# Patient Record
Sex: FEMALE | Race: WHITE | NOT HISPANIC OR LATINO | Employment: FULL TIME | URBAN - METROPOLITAN AREA
[De-identification: names, ages, dates, MRNs, and addresses within clinical notes are randomized per-mention and may not be internally consistent; named-entity substitution may affect disease eponyms.]

---

## 2022-06-01 ENCOUNTER — TELEPHONE (OUTPATIENT)
Dept: OBGYN CLINIC | Facility: CLINIC | Age: 52
End: 2022-06-01

## 2022-06-01 DIAGNOSIS — Z79.899 ON SSRI THERAPY: Primary | ICD-10-CM

## 2022-06-01 NOTE — TELEPHONE ENCOUNTER
Pt has a WA scheduled for 9/16/2022 and is requesting to have her prescription for her Sertraline renewed until seen  Rx; Sertraline 75 mg daily (50 mg tablet-takes 1 1/2 tablets daily)  [facility]  Last WA- [facility] 10/20/2020-pt reports she has been taking since last seen  New script was sent 3/2021

## 2022-09-14 PROBLEM — F32.A DEPRESSION: Status: ACTIVE | Noted: 2022-09-14

## 2022-09-14 PROBLEM — Z97.5 IUD (INTRAUTERINE DEVICE) IN PLACE: Status: ACTIVE | Noted: 2022-09-14

## 2022-09-14 NOTE — PROGRESS NOTES
Assessment/Plan:    Encounter for gynecological examination (general) (routine) without abnormal findings  Here for well check, doing well  Jose Desai in general, had months without bleeding, but light bleeding noted early this month for 5 days  Had been almost a year prior to that  No menopausal symptoms  Normal breast and pelvic exams  Last pap 9/2019 neg/HPV neg; due by 2024  Mammo order given, last 2020  Colonoscopy 7/2021      Depression/Anxiety  Has been on Sertraline 75 mg, would like to increase to 100 mg to manage some break through anxiety  Continues with significant life stressors  Rx sent  IUD (intrauterine device) in [facility] 10/4/2019  Will maintain up until 2026       Diagnoses and all orders for this visit:    Encounter for gynecological examination (general) (routine) without abnormal findings    Encounter for screening mammogram for malignant neoplasm of breast  -     Mammo screening bilateral w 3d & cad; Future    On SSRI therapy  -     sertraline (ZOLOFT) 100 mg tablet; Take 1 tablet (100 mg total) by mouth daily    Other orders  -     Levonorgestrel 20 1 MCG/DAY IUD; by Intrauterine route          Subjective:      Patient ID: Orlando Davis is a 46 y o  female  HPI Here for well check  The following portions of the patient's history were reviewed and updated as appropriate:   She  has a past medical history of Depression (Divorce), History of colonoscopy (07/12/2021), History of screening mammography (11/19/2020), Miscarriage, and Varicella (Child)  She   Patient Active Problem List    Diagnosis Date Noted    Encounter for gynecological examination (general) (routine) without abnormal findings 09/16/2022    Depression/Anxiety 09/14/2022    IUD (intrauterine device) in [facility] 10/4/2019 09/14/2022     She  has a past surgical history that includes Cholecystectomy; Mammo (historical) (11/24/2020); and Colonoscopy (07/2021)    Her family history includes Heart failure in her mother  She  reports that she has never smoked  She has never used smokeless tobacco  She reports that she does not drink alcohol and does not use drugs  Current Outpatient Medications   Medication Sig Dispense Refill    Levonorgestrel 20 1 MCG/DAY IUD by Intrauterine route      sertraline (ZOLOFT) 100 mg tablet Take 1 tablet (100 mg total) by mouth daily 90 tablet 4     No current facility-administered medications for this visit  She is allergic to sulfamethoxazole-trimethoprim and clindamycin       Review of Systems  No breast, bladder, bowel changes   No new persistent pain, bloating, early satiety or pelvic pressure      Objective:      /68   Ht 5' 6" (1 676 m)   Wt 75 5 kg (166 lb 6 4 oz)   Breastfeeding No   BMI 26 86 kg/m²          Physical Exam    General appearance: no distress, pleasant  Neck: thyroid without nodules or thyromegaly, no palpable adenopathy  Lymph nodes: no palpable adenopathy  Breasts: no masses, nodes or skin changes  Abdomen: soft, non tender, no palpable masses  Pelvic exam: normal external genitalia, urethral meatus normal, vagina without lesions, cervix with IUD string noted in os, no lesions, uterus small, non tender, no adnexal masses, non tender  Rectal exam: no masses, non tender, RV confirms above

## 2022-09-16 ENCOUNTER — ANNUAL EXAM (OUTPATIENT)
Dept: OBGYN CLINIC | Facility: CLINIC | Age: 52
End: 2022-09-16

## 2022-09-16 ENCOUNTER — TELEPHONE (OUTPATIENT)
Dept: OBGYN CLINIC | Facility: CLINIC | Age: 52
End: 2022-09-16

## 2022-09-16 VITALS
SYSTOLIC BLOOD PRESSURE: 100 MMHG | BODY MASS INDEX: 26.74 KG/M2 | WEIGHT: 166.4 LBS | DIASTOLIC BLOOD PRESSURE: 68 MMHG | HEIGHT: 66 IN

## 2022-09-16 DIAGNOSIS — Z12.31 ENCOUNTER FOR SCREENING MAMMOGRAM FOR MALIGNANT NEOPLASM OF BREAST: ICD-10-CM

## 2022-09-16 DIAGNOSIS — Z79.899 ON SSRI THERAPY: ICD-10-CM

## 2022-09-16 DIAGNOSIS — Z01.419 ENCOUNTER FOR GYNECOLOGICAL EXAMINATION (GENERAL) (ROUTINE) WITHOUT ABNORMAL FINDINGS: Primary | ICD-10-CM

## 2022-09-16 RX ORDER — SERTRALINE HYDROCHLORIDE 100 MG/1
100 TABLET, FILM COATED ORAL DAILY
Qty: 90 TABLET | Refills: 4 | Status: SHIPPED | OUTPATIENT
Start: 2022-09-16

## 2022-09-16 NOTE — ASSESSMENT & PLAN NOTE
Here for well check, doing well  Rambo Basket in general, had months without bleeding, but light bleeding noted early this month for 5 days  Had been almost a year prior to that  No menopausal symptoms  Normal breast and pelvic exams    Last pap 9/2019 neg/HPV neg; due by 2024  Mammo order given, last 2020  Colonoscopy 7/2021

## 2022-09-16 NOTE — LETTER
September 16, 2022     Hernandez Correa MD  [address] [facility] [address]    Patient: Maria Del Rosario Hendricks   YOB: 1970   Date of Visit: 9/16/2022       Dear Dr Silvia Ramos: Thank you for referring Maria Del Rosario Hendricks to me for evaluation  Below are my notes for this consultation  If you have questions, please do not hesitate to call me  I look forward to following your patient along with you  Sincerely,        Baron Talbert MD        CC: No Recipients  Baron Talbert MD  9/16/2022  4:10 PM  Addendum  Assessment/Plan:    Encounter for gynecological examination (general) (routine) without abnormal findings  Here for well check, doing well  Preeti Mathew in general, had months without bleeding, but light bleeding noted early this month for 5 days  Had been almost a year prior to that  No menopausal symptoms  Normal breast and pelvic exams  Last pap 9/2019 neg/HPV neg; due by 2024  Mammo order given, last 2020  Colonoscopy 7/2021      Depression/Anxiety  Has been on Sertraline 75 mg, would like to increase to 100 mg to manage some break through anxiety  Continues with significant life stressors  Rx sent  IUD (intrauterine device) in place - [facility] 10/4/2019  Will maintain up until 2026       Diagnoses and all orders for this visit:    Encounter for gynecological examination (general) (routine) without abnormal findings    Encounter for screening mammogram for malignant neoplasm of breast  -     Mammo screening bilateral w 3d & cad; Future    On SSRI therapy  -     sertraline (ZOLOFT) 100 mg tablet; Take 1 tablet (100 mg total) by mouth daily    Other orders  -     Levonorgestrel 20 1 MCG/DAY IUD; by Intrauterine route          Subjective:      Patient ID: Maria Del Rosario Hendricks is a 46 y o  female  HPI Here for well check        The following portions of the patient's history were reviewed and updated as appropriate:   She  has a past medical history of Depression (Divorce), History of colonoscopy (07/12/2021), History of screening mammography (11/19/2020), Miscarriage, and Varicella (Child)  She   Patient Active Problem List    Diagnosis Date Noted    Encounter for gynecological examination (general) (routine) without abnormal findings 09/16/2022    Depression/Anxiety 09/14/2022    IUD (intrauterine device) in place - [facility] 10/4/2019 09/14/2022     She  has a past surgical history that includes Cholecystectomy; Mammo (historical) (11/24/2020); and Colonoscopy (07/2021)  Her family history includes Heart failure in her mother  She  reports that she has never smoked  She has never used smokeless tobacco  She reports that she does not drink alcohol and does not use drugs  Current Outpatient Medications   Medication Sig Dispense Refill    Levonorgestrel 20 1 MCG/DAY IUD by Intrauterine route      sertraline (ZOLOFT) 100 mg tablet Take 1 tablet (100 mg total) by mouth daily 90 tablet 4     No current facility-administered medications for this visit  She is allergic to sulfamethoxazole-trimethoprim and clindamycin       Review of Systems  No breast, bladder, bowel changes   No new persistent pain, bloating, early satiety or pelvic pressure      Objective:      /68   Ht 5' 6" (1 676 m)   Wt 75 5 kg (166 lb 6 4 oz)   Breastfeeding No   BMI 26 86 kg/m²          Physical Exam    General appearance: no distress, pleasant  Neck: thyroid without nodules or thyromegaly, no palpable adenopathy  Lymph nodes: no palpable adenopathy  Breasts: no masses, nodes or skin changes  Abdomen: soft, non tender, no palpable masses  Pelvic exam: normal external genitalia, urethral meatus normal, vagina without lesions, cervix with IUD string noted in os, no lesions, uterus small, non tender, no adnexal masses, non tender  Rectal exam: no masses, non tender, RV confirms above

## 2022-09-16 NOTE — TELEPHONE ENCOUNTER
Spoke with patient and she informed she had a Colonoscopy done on 7/12/2021 through [facility], [address] for [address] [place] [address]

## 2022-09-16 NOTE — TELEPHONE ENCOUNTER
Please call pt, I failed to ask about colonoscopy at today's visit  If done, please document  If not, I recommend either [facility]: [phone] or [place] [facility]: [phone]  Thanks

## 2022-09-16 NOTE — ASSESSMENT & PLAN NOTE
Has been on Sertraline 75 mg, would like to increase to 100 mg to manage some break through anxiety  Continues with significant life stressors  Rx sent

## 2022-09-16 NOTE — PATIENT INSTRUCTIONS
Return to office in one year unless having any problems such as breast changes, bleeding, new persistent pain, new progressive bloating, new problems eating (getting full to quickly) or new constant urinary pressure that does not resolve in one week  Call in six months to schedule your annual visit  For the incontinence, I recommend bladder training with timed voids every 2-3 hours while awake, avoidance of irritants specifically tea/coffee/soda products and Kegel exercises  You can teach yourself the exercises by stopping your urine flow on the toilet  Then hold for 10 seconds, 10 times in a row  If you do that set of exercises 2-3 times per day, you will gain strength, then when you sneeze you can squeeze up first and getter better control

## 2022-09-16 NOTE — LETTER
September 16, 2022     Asif Traore MD  [address] [facility] [address]    Patient: Tod Vallecillo   YOB: 1970   Date of Visit: 9/16/2022       Dear Dr Yvette Gutierrez: Thank you for referring Tod Vallecillo to me for evaluation  Below are my notes for this consultation  If you have questions, please do not hesitate to call me  I look forward to following your patient along with you  Sincerely,        Janiya Bates MD        CC: No Recipients  Janiya Bates MD  9/16/2022  1:35 PM  Sign when Signing Visit  Assessment/Plan:    Encounter for gynecological examination (general) (routine) without abnormal findings  Here for well check, doing well  Candelario Adame in general, had months without bleeding, but light bleeding noted early this month for 5 days  Had been almost a year prior to that  No menopausal symptoms  Normal breast and pelvic exams  Last pap 9/2019 neg/HPV neg; due by 2024  Mammo order given, last 2020  No colonoscopy noted to date  Depression/Anxiety  Has been on Sertraline 75 mg, would like to increase to 100 mg to manage some break through anxiety  Continues with significant life stressors  Rx sent  IUD (intrauterine device) in place - [facility] 10/4/2019  Will maintain up until 2026       Diagnoses and all orders for this visit:    Encounter for gynecological examination (general) (routine) without abnormal findings    Encounter for screening mammogram for malignant neoplasm of breast  -     Mammo screening bilateral w 3d & cad; Future    On SSRI therapy  -     sertraline (ZOLOFT) 100 mg tablet; Take 1 tablet (100 mg total) by mouth daily    Other orders  -     Levonorgestrel 20 1 MCG/DAY IUD; by Intrauterine route          Subjective:      Patient ID: Tod Vallecillo is a 46 y o  female  HPI Here for well check        The following portions of the patient's history were reviewed and updated as appropriate:   She  has a past medical history of Depression (Divorce), History of screening mammography (11/19/2020), Miscarriage, and Varicella (Child)  She   Patient Active Problem List    Diagnosis Date Noted    Encounter for gynecological examination (general) (routine) without abnormal findings 09/16/2022    Depression/Anxiety 09/14/2022    IUD (intrauterine device) in place - [facility] 10/4/2019 09/14/2022     She  has a past surgical history that includes Cholecystectomy and Mammo (historical) (11/24/2020)  Her family history includes Heart failure in her mother  She  reports that she has never smoked  She has never used smokeless tobacco  She reports that she does not drink alcohol and does not use drugs  Current Outpatient Medications   Medication Sig Dispense Refill    Levonorgestrel 20 1 MCG/DAY IUD by Intrauterine route      sertraline (ZOLOFT) 100 mg tablet Take 1 tablet (100 mg total) by mouth daily 90 tablet 4     No current facility-administered medications for this visit  She is allergic to sulfamethoxazole-trimethoprim and clindamycin       Review of Systems  No breast, bladder, bowel changes   No new persistent pain, bloating, early satiety or pelvic pressure      Objective:      /68   Ht 5' 6" (1 676 m)   Wt 75 5 kg (166 lb 6 4 oz)   Breastfeeding No   BMI 26 86 kg/m²          Physical Exam    General appearance: no distress, pleasant  Neck: thyroid without nodules or thyromegaly, no palpable adenopathy  Lymph nodes: no palpable adenopathy  Breasts: no masses, nodes or skin changes  Abdomen: soft, non tender, no palpable masses  Pelvic exam: normal external genitalia, urethral meatus normal, vagina without lesions, cervix with IUD string noted in os, no lesions, uterus small, non tender, no adnexal masses, non tender  Rectal exam: no masses, non tender, RV confirms above

## 2022-10-18 DIAGNOSIS — Z12.31 ENCOUNTER FOR SCREENING MAMMOGRAM FOR MALIGNANT NEOPLASM OF BREAST: ICD-10-CM

## 2023-10-13 ENCOUNTER — ANNUAL EXAM (OUTPATIENT)
Dept: OBGYN CLINIC | Facility: CLINIC | Age: 53
End: 2023-10-13
Payer: COMMERCIAL

## 2023-10-13 VITALS
DIASTOLIC BLOOD PRESSURE: 68 MMHG | SYSTOLIC BLOOD PRESSURE: 122 MMHG | BODY MASS INDEX: 27.74 KG/M2 | HEIGHT: 66 IN | WEIGHT: 172.6 LBS

## 2023-10-13 DIAGNOSIS — Z12.4 SCREENING FOR MALIGNANT NEOPLASM OF THE CERVIX: ICD-10-CM

## 2023-10-13 DIAGNOSIS — Z01.419 ENCOUNTER FOR GYNECOLOGICAL EXAMINATION (GENERAL) (ROUTINE) WITHOUT ABNORMAL FINDINGS: Primary | ICD-10-CM

## 2023-10-13 DIAGNOSIS — Z79.899 ON SSRI THERAPY: ICD-10-CM

## 2023-10-13 DIAGNOSIS — Z12.31 BREAST CANCER SCREENING BY MAMMOGRAM: ICD-10-CM

## 2023-10-13 PROCEDURE — 99396 PREV VISIT EST AGE 40-64: CPT | Performed by: OBSTETRICS & GYNECOLOGY

## 2023-10-13 RX ORDER — SERTRALINE HYDROCHLORIDE 100 MG/1
100 TABLET, FILM COATED ORAL DAILY
Qty: 90 TABLET | Refills: 4 | Status: SHIPPED | OUTPATIENT
Start: 2023-10-13

## 2023-10-13 NOTE — ASSESSMENT & PLAN NOTE
Here for well check, happy with Liletta, no menses, no menopause symptoms. Lost mother 12/2021 to cardiac arrest at 76. Doing well on sertraline, no SI. Refills sent. Normal breast and pelvic exams.   Last pap 9/2019 neg/HPV neg; repeated today  Mammo order given, last 9/30/22  Colonoscopy 7/2021, due ?2028

## 2023-10-13 NOTE — PATIENT INSTRUCTIONS
Return to office in one year unless having any problems such as breast changes, bleeding, new persistent pain, new progressive bloating, new problems eating (getting full to quickly) or new constant urinary pressure that does not resolve in one week. Call in six months to schedule your annual visit.

## 2023-10-13 NOTE — PROGRESS NOTES
Assessment/Plan:    Encounter for gynecological examination (general) (routine) without abnormal findings  Here for well check, happy with Liletta, no menses, no menopause symptoms. Lost mother 12/2021 to cardiac arrest at 76. Doing well on sertraline, no SI. Refills sent. Normal breast and pelvic exams. Last pap 9/2019 neg/HPV neg; repeated today  Mammo order given, last 9/30/22  Colonoscopy 7/2021, due ?2028     Diagnoses and all orders for this visit:    Breast cancer screening by mammogram  -     Mammo screening bilateral w 3d & cad; Future    On SSRI therapy  -     sertraline (ZOLOFT) 100 mg tablet; Take 1 tablet (100 mg total) by mouth daily    Screening for malignant neoplasm of the cervix  -     IGP, Aptima HPV, Rfx 16/18,45    Encounter for gynecological examination (general) (routine) without abnormal findings          Subjective:      Patient ID: Kristine Ndiaye is a 46 y.o. female. HPI Here for well check. The following portions of the patient's history were reviewed and updated as appropriate: She  has a past medical history of Depression (Divorce), History of colonoscopy (07/12/2021), History of screening mammography (11/19/2020), Miscarriage, and Varicella (Child). She   Patient Active Problem List    Diagnosis Date Noted    Encounter for gynecological examination (general) (routine) without abnormal findings 09/16/2022    Depression/Anxiety 09/14/2022    IUD (intrauterine device) in place - Yenny Greer 10/4/2019 09/14/2022     She  has a past surgical history that includes Cholecystectomy; Mammo (historical) (11/24/2020); and Colonoscopy (07/2021). Her family history includes Colon cancer in her paternal grandfather; Glaucoma in her maternal grandmother; Heart failure in her mother; Lung cancer in her maternal grandfather; No Known Problems in her daughter, daughter, paternal grandmother, sister, sister, sister, and son; Prostate cancer in her father and paternal grandfather.   She  reports that she has never smoked. She has never used smokeless tobacco. She reports that she does not drink alcohol and does not use drugs. Current Outpatient Medications   Medication Sig Dispense Refill    Levonorgestrel 20.1 MCG/DAY IUD by Intrauterine route      sertraline (ZOLOFT) 100 mg tablet Take 1 tablet (100 mg total) by mouth daily 90 tablet 4     No current facility-administered medications for this visit. She is allergic to sulfamethoxazole-trimethoprim and clindamycin. .    Review of Systems  No breast, bladder, bowel changes.  No new persistent pain, bloating, early satiety or pelvic pressure  No menses with Liletta    Objective:      /68 (BP Location: Left arm, Patient Position: Sitting, Cuff Size: Large)   Ht 5' 5.5" (1.664 m)   Wt 78.3 kg (172 lb 9.6 oz)   BMI 28.29 kg/m²          Physical Exam    General appearance: no distress, pleasant  Neck: thyroid without nodules or thyromegaly, no palpable adenopathy  Lymph nodes: no palpable adenopathy  Breasts: no masses, nodes or skin changes  Abdomen: soft, non tender, no palpable masses  Pelvic exam: normal external genitalia, urethral meatus normal, vagina without lesions, cervix with IUD string in os and without lesions, uterus small, non tender, no adnexal masses, non tender  Rectal exam: no masses, non tender, RV confirms above

## 2023-10-13 NOTE — LETTER
October 13, 2023     Renato Anton MD  [address]    Patient: Severo Haddock   YOB: 1970   Date of Visit: 10/13/2023       Dear Dr. Benjamin De Paz:    Severo Haddock was in today for her annual gyn exam. Below are my notes for this consultation. If you have questions, please do not hesitate to call me. I look forward to following your patient along with you. Sincerely,        Anya Thompson MD        CC: No Recipients    Anya Thompson MD  10/13/2023  9:40 AM  Sign when Signing Visit  Assessment/Plan:    Encounter for gynecological examination (general) (routine) without abnormal findings  Here for well check, happy with Liletta, no menses, no menopause symptoms. Lost mother 12/2021 to cardiac arrest at 76. Doing well on sertraline, no SI. Refills sent. Normal breast and pelvic exams. Last pap 9/2019 neg/HPV neg; repeated today  Mammo order given, last 9/30/22  Colonoscopy 7/2021, due ?2028     Diagnoses and all orders for this visit:    Breast cancer screening by mammogram  -     Mammo screening bilateral w 3d & cad; Future    On SSRI therapy  -     sertraline (ZOLOFT) 100 mg tablet; Take 1 tablet (100 mg total) by mouth daily    Screening for malignant neoplasm of the cervix  -     IGP, Aptima HPV, Rfx 16/18,45    Encounter for gynecological examination (general) (routine) without abnormal findings          Subjective:      Patient ID: Severo Haddock is a 46 y.o. female. HPI Here for well check. The following portions of the patient's history were reviewed and updated as appropriate: She  has a past medical history of Depression (Divorce), History of colonoscopy (07/12/2021), History of screening mammography (11/19/2020), Miscarriage, and Varicella (Child).   She   Patient Active Problem List    Diagnosis Date Noted   • Encounter for gynecological examination (general) (routine) without abnormal findings 09/16/2022   • Depression/Anxiety 09/14/2022   • IUD (intrauterine device) in place - Jackeline Mehta 10/4/2019 09/14/2022     She  has a past surgical history that includes Cholecystectomy; Mammo (historical) (11/24/2020); and Colonoscopy (07/2021). Her family history includes Colon cancer in her paternal grandfather; Glaucoma in her maternal grandmother; Heart failure in her mother; Lung cancer in her maternal grandfather; No Known Problems in her daughter, daughter, paternal grandmother, sister, sister, sister, and son; Prostate cancer in her father and paternal grandfather. She  reports that she has never smoked. She has never used smokeless tobacco. She reports that she does not drink alcohol and does not use drugs. Current Outpatient Medications   Medication Sig Dispense Refill   • Levonorgestrel 20.1 MCG/DAY IUD by Intrauterine route     • sertraline (ZOLOFT) 100 mg tablet Take 1 tablet (100 mg total) by mouth daily 90 tablet 4     No current facility-administered medications for this visit. She is allergic to sulfamethoxazole-trimethoprim and clindamycin. .    Review of Systems  No breast, bladder, bowel changes.  No new persistent pain, bloating, early satiety or pelvic pressure  No menses with Liletta    Objective:      /68 (BP Location: Left arm, Patient Position: Sitting, Cuff Size: Large)   Ht 5' 5.5" (1.664 m)   Wt 78.3 kg (172 lb 9.6 oz)   BMI 28.29 kg/m²          Physical Exam    General appearance: no distress, pleasant  Neck: thyroid without nodules or thyromegaly, no palpable adenopathy  Lymph nodes: no palpable adenopathy  Breasts: no masses, nodes or skin changes  Abdomen: soft, non tender, no palpable masses  Pelvic exam: normal external genitalia, urethral meatus normal, vagina without lesions, cervix with IUD string in os and without lesions, uterus small, non tender, no adnexal masses, non tender  Rectal exam: no masses, non tender, RV confirms above

## 2023-10-18 LAB
CYTOLOGIST CVX/VAG CYTO: NORMAL
DX ICD CODE: NORMAL
HPV GENOTYPE REFLEX: NORMAL
HPV I/H RISK 4 DNA CVX QL PROBE+SIG AMP: NEGATIVE
OTHER STN SPEC: NORMAL
PATH REPORT.FINAL DX SPEC: NORMAL
SL AMB NOTE:: NORMAL
SL AMB SPECIMEN ADEQUACY: NORMAL
SL AMB TEST METHODOLOGY: NORMAL

## 2023-11-22 DIAGNOSIS — Z12.31 BREAST CANCER SCREENING BY MAMMOGRAM: ICD-10-CM

## 2024-02-21 PROBLEM — Z01.419 ENCOUNTER FOR GYNECOLOGICAL EXAMINATION (GENERAL) (ROUTINE) WITHOUT ABNORMAL FINDINGS: Status: RESOLVED | Noted: 2022-09-16 | Resolved: 2024-02-21

## 2024-10-11 NOTE — PROGRESS NOTES
Assessment/Plan:    Encounter for gynecological examination (general) (routine) without abnormal findings  Here for well check, no breast or gyn complaints.   No bleeding, IUD effective until 2027.  Normal breast and pelvic exams.  Last pap 10/2023 neg/HPV neg; due 2028  Mammo order given, last 10/14/23 BIRADS 2C  Colonoscopy 7/2021, due 2028?, maybe 2026 with f/h colon cancer and polyps  Dexa to start @55, discussed depending on steroid intake    Depression/Anxiety  Stable on 100 mg sertraline. Rx refilled       Diagnoses and all orders for this visit:    Breast cancer screening by mammogram  -     Mammo screening bilateral w 3d and cad; Future    Encounter for gynecological examination (general) (routine) without abnormal findings    On SSRI therapy  -     sertraline (ZOLOFT) 100 mg tablet; Take 1 tablet (100 mg total) by mouth daily    Other depression    Other orders  -     Liquid-based pap, screening  -     Vtama 1 % CREA; APPLY 1(ONE) APPLICATION TOPICALLY EVERY NIGHT AT BEDTIME          Subjective:      Patient ID: Keena He is a 53 y.o. female.    HPI Here for well check.    The following portions of the patient's history were reviewed and updated as appropriate: She  has a past medical history of Depression (Divorce), History of colonoscopy (07/12/2021), History of screening mammography (11/19/2020), Miscarriage, Psoriasis, and Varicella (Child).  She   Patient Active Problem List    Diagnosis Date Noted    Depression/Anxiety 09/14/2022    IUD (intrauterine device) in place - Liletta 10/4/2019 09/14/2022     She  has a past surgical history that includes Cholecystectomy; Mammo (historical) (11/24/2020); and Colonoscopy (07/2021).  Her family history includes Colon cancer in her paternal grandfather; Colon polyps in her father; Glaucoma in her maternal grandmother; Heart failure in her mother; Lung cancer in her maternal grandfather; No Known Problems in her daughter, daughter, paternal grandmother,  "sister, sister, sister, and son; Prostate cancer in her father and paternal grandfather.  She  reports that she has never smoked. She has never used smokeless tobacco. She reports that she does not drink alcohol and does not use drugs.  Current Outpatient Medications   Medication Sig Dispense Refill    Levonorgestrel 20.1 MCG/DAY IUD by Intrauterine route      sertraline (ZOLOFT) 100 mg tablet Take 1 tablet (100 mg total) by mouth daily 90 tablet 4    Vtama 1 % CREA APPLY 1(ONE) APPLICATION TOPICALLY EVERY NIGHT AT BEDTIME       No current facility-administered medications for this visit.     She is allergic to sulfamethoxazole-trimethoprim and clindamycin..    Review of Systems  No breast, bladder, bowel changes. No new persistent pain, bloating, early satiety or pelvic pressure  No bleeding, IUD    Objective:      /78 (BP Location: Left arm, Patient Position: Sitting, Cuff Size: Large)   Ht 5' 6\" (1.676 m)   Wt 76.7 kg (169 lb 3.2 oz)   BMI 27.31 kg/m²          Physical Exam    General appearance: no distress, pleasant  Neck: thyroid without nodules or thyromegaly, no palpable adenopathy  Lymph nodes: no palpable adenopathy  Breasts: no masses, nodes or skin changes  Abdomen: soft, non tender, no palpable masses  Pelvic exam: normal external genitalia, urethral meatus normal, vagina atrophic without lesions, cervix with IUD string in os and without lesions, uterus small, non tender, no adnexal masses, non tender  Rectal exam: no masses, non tender, RV confirms above    "

## 2024-10-15 ENCOUNTER — ANNUAL EXAM (OUTPATIENT)
Dept: OBGYN CLINIC | Facility: CLINIC | Age: 54
End: 2024-10-15
Payer: COMMERCIAL

## 2024-10-15 VITALS
DIASTOLIC BLOOD PRESSURE: 78 MMHG | BODY MASS INDEX: 27.19 KG/M2 | SYSTOLIC BLOOD PRESSURE: 110 MMHG | WEIGHT: 169.2 LBS | HEIGHT: 66 IN

## 2024-10-15 DIAGNOSIS — F32.89 OTHER DEPRESSION: ICD-10-CM

## 2024-10-15 DIAGNOSIS — Z01.419 ENCOUNTER FOR GYNECOLOGICAL EXAMINATION (GENERAL) (ROUTINE) WITHOUT ABNORMAL FINDINGS: Primary | ICD-10-CM

## 2024-10-15 DIAGNOSIS — Z12.31 BREAST CANCER SCREENING BY MAMMOGRAM: ICD-10-CM

## 2024-10-15 DIAGNOSIS — Z79.899 ON SSRI THERAPY: ICD-10-CM

## 2024-10-15 PROCEDURE — 99396 PREV VISIT EST AGE 40-64: CPT | Performed by: OBSTETRICS & GYNECOLOGY

## 2024-10-15 RX ORDER — TAPINAROF 10 MG/1000MG
CREAM TOPICAL
COMMUNITY
Start: 2024-09-04

## 2024-10-15 RX ORDER — SERTRALINE HYDROCHLORIDE 100 MG/1
100 TABLET, FILM COATED ORAL DAILY
Qty: 90 TABLET | Refills: 4 | Status: SHIPPED | OUTPATIENT
Start: 2024-10-15

## 2024-10-15 NOTE — PATIENT INSTRUCTIONS
Carleen Mejia, PCP in [place]    Return to office in one year unless having any problems such as breast changes, bleeding, new persistent pain, new progressive bloating, new problems eating (getting full to quickly) or new constant urinary pressure that does not resolve in one week.    Continue to strive for 1200 mg of calcium and 1000 IU of vitamin D daily in diet and supplements combined for your bone health.  You can only absorb 600 mg of calcium at a time. Avoid excess calcium as this may adversely effect your heart.  There are 400 mg of calcium in an 8 oz serving of dairy.  [place] milk has 600 mg of calcium in an 8 oz serving.

## 2024-10-15 NOTE — ASSESSMENT & PLAN NOTE
Here for well check, no breast or gyn complaints.   No bleeding, IUD effective until 2027.  Normal breast and pelvic exams.  Last pap 10/2023 neg/HPV neg; due 2028  Mammo order given, last 10/14/23 BIRADS 2C  Colonoscopy 7/2021, due 2028?, maybe 2026 with f/h colon cancer and polyps  Dexa to start @55, discussed depending on steroid intake

## 2024-10-17 DIAGNOSIS — Z12.31 BREAST CANCER SCREENING BY MAMMOGRAM: ICD-10-CM

## 2024-11-19 ENCOUNTER — OFFICE VISIT (OUTPATIENT)
Dept: FAMILY MEDICINE CLINIC | Facility: CLINIC | Age: 54
End: 2024-11-19
Payer: COMMERCIAL

## 2024-11-19 VITALS
SYSTOLIC BLOOD PRESSURE: 140 MMHG | HEART RATE: 64 BPM | WEIGHT: 173.6 LBS | DIASTOLIC BLOOD PRESSURE: 79 MMHG | OXYGEN SATURATION: 98 % | BODY MASS INDEX: 28.02 KG/M2

## 2024-11-19 DIAGNOSIS — M25.50 ARTHRALGIA, UNSPECIFIED JOINT: ICD-10-CM

## 2024-11-19 DIAGNOSIS — Z00.00 ANNUAL PHYSICAL EXAM: Primary | ICD-10-CM

## 2024-11-19 DIAGNOSIS — Z13.1 SCREENING FOR DIABETES MELLITUS: ICD-10-CM

## 2024-11-19 DIAGNOSIS — Z13.0 SCREENING FOR DEFICIENCY ANEMIA: ICD-10-CM

## 2024-11-19 DIAGNOSIS — Z13.6 SCREENING FOR CARDIOVASCULAR CONDITION: ICD-10-CM

## 2024-11-19 DIAGNOSIS — F32.5 MAJOR DEPRESSIVE DISORDER WITH SINGLE EPISODE, IN FULL REMISSION (HCC): ICD-10-CM

## 2024-11-19 DIAGNOSIS — Z13.29 SCREENING FOR THYROID DISORDER: ICD-10-CM

## 2024-11-19 DIAGNOSIS — Z76.89 ENCOUNTER TO ESTABLISH CARE WITH NEW DOCTOR: ICD-10-CM

## 2024-11-19 DIAGNOSIS — L40.9 PSORIASIS: ICD-10-CM

## 2024-11-19 DIAGNOSIS — R03.0 ELEVATED BLOOD PRESSURE READING WITHOUT DIAGNOSIS OF HYPERTENSION: ICD-10-CM

## 2024-11-19 PROBLEM — F41.9 ANXIETY: Status: ACTIVE | Noted: 2024-11-19

## 2024-11-19 PROBLEM — F32.A DEPRESSION: Status: RESOLVED | Noted: 2022-09-14 | Resolved: 2024-11-19

## 2024-11-19 PROCEDURE — 99214 OFFICE O/P EST MOD 30 MIN: CPT | Performed by: FAMILY MEDICINE

## 2024-11-19 PROCEDURE — 99386 PREV VISIT NEW AGE 40-64: CPT | Performed by: FAMILY MEDICINE

## 2024-11-19 RX ORDER — TIRZEPATIDE 2.5 MG/.5ML
2.5 INJECTION, SOLUTION SUBCUTANEOUS WEEKLY
Qty: 2 ML | Refills: 0 | Status: SHIPPED | OUTPATIENT
Start: 2024-11-19 | End: 2024-12-17

## 2024-11-19 RX ORDER — CLOBETASOL PROPIONATE 0.5 MG/G
CREAM TOPICAL 2 TIMES DAILY
COMMUNITY
Start: 2024-11-07

## 2024-11-19 NOTE — PATIENT INSTRUCTIONS
"Patient Education     Routine physical for adults   The Basics   Written by the doctors and editors at [facility]   What is a physical? -- A physical is a routine visit, or \"check-up,\" with your doctor. You might also hear it called a \"wellness visit\" or \"preventive visit.\"  During each visit, the doctor will:   Ask about your physical and mental health   Ask about your habits, behaviors, and lifestyle   Do an exam   Give you vaccines if needed   Talk to you about any medicines you take   Give advice about your health   Answer your questions  Getting regular check-ups is an important part of taking care of your health. It can help your doctor find and treat any problems you have. But it's also important for preventing health problems.  A routine physical is different from a \"sick visit.\" A sick visit is when you see a doctor because of a health concern or problem. Since physicals are scheduled ahead of time, you can think about what you want to ask the doctor.  How often should I get a physical? -- It depends on your age and health. In general, for people age 21 years and older:   If you are younger than 50 years, you might be able to get a physical every 3 years.   If you are 50 years or older, your doctor might recommend a physical every year.  If you have an ongoing health condition, like diabetes or high blood pressure, your doctor will probably want to see you more often.  What happens during a physical? -- In general, each visit will include:   Physical exam - The doctor or nurse will check your height, weight, heart rate, and blood pressure. They will also look at your eyes and ears. They will ask about how you are feeling and whether you have any symptoms that bother you.   Medicines - It's a good idea to bring a list of all the medicines you take to each doctor visit. Your doctor will talk to you about your medicines and answer any questions. Tell them if you are having any side effects that bother you. You " "should also tell them if you are having trouble paying for any of your medicines.   Habits and behaviors - This includes:   Your diet   Your exercise habits   Whether you smoke, drink alcohol, or use drugs   Whether you are sexually active   Whether you feel safe at home  Your doctor will talk to you about things you can do to improve your health and lower your risk of health problems. They will also offer help and support. For example, if you want to quit smoking, they can give you advice and might prescribe medicines. If you want to improve your diet or get more physical activity, they can help you with this, too.   Lab tests, if needed - The tests you get will depend on your age and situation. For example, your doctor might want to check your:   Cholesterol   Blood sugar   Iron level   Vaccines - The recommended vaccines will depend on your age, health, and what vaccines you already had. Vaccines are very important because they can prevent certain serious or deadly infections.   Discussion of screening - \"Screening\" means checking for diseases or other health problems before they cause symptoms. Your doctor can recommend screening based on your age, risk, and preferences. This might include tests to check for:   Cancer, such as breast, prostate, cervical, ovarian, colorectal, prostate, lung, or skin cancer   Sexually transmitted infections, such as chlamydia and gonorrhea   Mental health conditions like depression and anxiety  Your doctor will talk to you about the different types of screening tests. They can help you decide which screenings to have. They can also explain what the results might mean.   Answering questions - The physical is a good time to ask the doctor or nurse questions about your health. If needed, they can refer you to other doctors or specialists, too.  Adults older than 65 years often need other care, too. As you get older, your doctor will talk to you about:   How to prevent falling at " home   Hearing or vision tests   Memory testing   How to take your medicines safely   Making sure that you have the help and support you need at home  All topics are updated as new evidence becomes available and our peer review process is complete.  This topic retrieved from [employer] on: May 02, 2024.  Topic 229522 Version 1.0  Release: 32.4.3 - C32.122  © 2024 UpToDate, Inc. and/or its affiliates. All rights reserved.  Consumer Information Use and Disclaimer   Disclaimer: This generalized information is a limited summary of diagnosis, treatment, and/or medication information. It is not meant to be comprehensive and should be used as a tool to help the user understand and/or assess potential diagnostic and treatment options. It does NOT include all information about conditions, treatments, medications, side effects, or risks that may apply to a specific patient. It is not intended to be medical advice or a substitute for the medical advice, diagnosis, or treatment of a health care provider based on the health care provider's examination and assessment of a patient's specific and unique circumstances. Patients must speak with a health care provider for complete information about their health, medical questions, and treatment options, including any risks or benefits regarding use of medications. This information does not endorse any treatments or medications as safe, effective, or approved for treating a specific patient. UpToDate, Inc. and its affiliates disclaim any warranty or liability relating to this information or the use thereof.The use of this information is governed by the Terms of Use, available at https://www.wolters[employer]cl[employer] Therapeuticsuwer.com/en/know/clinical-effectiveness-terms. 2024© UpToDate, Inc. and its affiliates and/or licensors. All rights reserved.  Copyright   © 2024 UpToDate, Inc. and/or its affiliates. All rights reserved.

## 2024-11-19 NOTE — PROGRESS NOTES
Adult Annual Physical  Name: Keena He      : 1970      MRN: [id number]  Encounter Provider: Carleen Mejia DO  Encounter Date: 2024   Encounter department: [facility]    Assessment & Plan  Annual physical exam  Discussed diet and exercise  Screening labs ordered  Pap, colon cancer screening and mammogram are up to date.   Td up to date.   Had flu vaccine this season,.        Encounter to establish care with new doctor         BMI 28.0-28.9,adult   Patient denies personal and family history of MCT and MEN2 tumors. Patient denies personal history of pancreatitis   Does have family history of pancreatitis in paternal GF.   She has been trying to watch diet, limit calories and difficulty getting weight off.   Rx for zepbound sent to pharmacy. Doubt that this will be covered given her bmi of 28  If not, can consider referral to weight management.   Orders:  •  tirzepatide (Zepbound) 2.5 mg/0.5 mL auto-injector; Inject 0.5 mL (2.5 mg total) under the skin once a week for 28 days    Psoriasis  Recently diagnosed with this by dermatologist in [place].     Orders:  •  Ambulatory Referral to Rheumatology    Screening for diabetes mellitus    Orders:  •  Comprehensive metabolic panel    Screening for cardiovascular condition    Orders:  •  Lipid panel    Screening for deficiency anemia    Orders:  •  CBC and differential    Screening for thyroid disorder    Orders:  •  TSH, 3rd generation with Free T4 reflex    Major depressive disorder with single episode, in full remission (HCC)  Stable on zoloft 100 mg daily         Elevated blood pressure reading without diagnosis of hypertension  DASH diet.   Check ambulatory bp at home.        Arthralgia, unspecified joint  Has pain in joints. Not terrible. All over. No obvious swelling.   Just diagnosed with psoriasis so wondering if joints are involved  Check labs  Refer rheum  Orders:  •  Ambulatory Referral to Rheumatology  •   C-reactive protein  •  Cyclic citrul peptide antibody, IgG  •  MITZY Screen w/ Reflex to Titer/Pattern  •  Lyme Total AB W Reflex to IGM/IGG      Immunizations and preventive care screenings were discussed with patient today. Appropriate education was printed on patient's after visit summary.    Counseling:  Alcohol/drug use: discussed moderation in alcohol intake, the recommendations for healthy alcohol use, and avoidance of illicit drug use.  Dental Health: discussed importance of regular tooth brushing, flossing, and dental visits.  Injury prevention: discussed safety/seat belts, safety helmets, smoke detectors, carbon monoxide detectors, and smoking near bedding or upholstery.  Sexual health: discussed sexually transmitted diseases, partner selection, use of condoms, avoidance of unintended pregnancy, and contraceptive alternatives.  Exercise: the importance of regular exercise/physical activity was discussed. Recommend exercise 3-5 times per week for at least 30 minutes.          History of Present Illness     Adult Annual Physical:  Patient presents for annual physical.     Diet and Physical Activity:  - Diet/Nutrition:. bagel or muffin for breakfast. luch is sandwich. dinner is either cereal or something light  - Exercise: walking and 1-2 times a week on average. also goes to gym at [email or web address].    Depression Screening:    - PHQ-9 Score: 1    General Health:  - Sleep: sleeps well.  - Hearing: normal hearing right ear and normal hearing left ear.  - Vision: no vision problems, wears glasses and most recent eye exam > 1 year ago.  - Dental: regular dental visits.    /GYN Health:  - Follows with GYN: yes.   - Contraception: IUD placement.      Patient is a 53 year old female who is being seen today as a new patient to establish care. Is due for PE.     Previous PCP=Jose Pace MD at [facility]  There are no records for review at time of today's visit  Colon cancer screen is up to date (9/16/22)  Cervical cancer screen  is up to date (10/13/03)--sees gyn at [facility].   Mammogram is up to date (10/15/24)  Shingrix up to date.   Had Td in 2015.     Chronic medical problems as noted below:  Patient Active Problem List   Diagnosis   • IUD (intrauterine device) in place - Liletta 10/4/2019   • Psoriasis   • Major depressive disorder with single episode, in full remission (HCC)   • Anxiety     Wants to try one of the new weight loss drugs  Would like to lose about 30 lbs.             Review of Systems  Medical History Reviewed by provider this encounter:  Tobacco  Allergies  Meds  Problems  Med Hx  Surg Hx  Fam Hx  Soc   Hx    .  Current Outpatient Medications on File Prior to Visit   Medication Sig Dispense Refill   • clobetasol (TEMOVATE) 0.05 % cream Apply topically 2 (two) times a day Apply to effected area at bedtime for two weeks.     • Levonorgestrel 20.1 MCG/DAY IUD by Intrauterine route     • sertraline (ZOLOFT) 100 mg tablet Take 1 tablet (100 mg total) by mouth daily 90 tablet 4   • Vtama 1 % CREA APPLY 1(ONE) APPLICATION TOPICALLY EVERY NIGHT AT BEDTIME       No current facility-administered medications on file prior to visit.        Objective   /79   Pulse 64   Wt 78.7 kg (173 lb 9.6 oz)   SpO2 98%   BMI 28.02 kg/m²     Physical Exam  Vitals and nursing note reviewed.   Constitutional:       General: She is not in acute distress.     Appearance: Normal appearance. She is not ill-appearing, toxic-appearing or diaphoretic.   HENT:      Head: Normocephalic and atraumatic.      Right Ear: Tympanic membrane normal.      Left Ear: Tympanic membrane normal.      Nose: Nose normal.      Mouth/Throat:      Mouth: Mucous membranes are moist.      Pharynx: No posterior oropharyngeal erythema.   Eyes:      Extraocular Movements: Extraocular movements intact.      Conjunctiva/sclera: Conjunctivae normal.      Pupils: Pupils are equal, round, and reactive to light.   Cardiovascular:      Rate and Rhythm: Normal rate and  regular rhythm.      Heart sounds: No murmur heard.  Pulmonary:      Effort: Pulmonary effort is normal.      Breath sounds: Normal breath sounds.   Abdominal:      General: Abdomen is flat. Bowel sounds are normal.      Palpations: Abdomen is soft.   Musculoskeletal:      Cervical back: Normal range of motion and neck supple.      Right lower leg: No edema.      Left lower leg: No edema.   Lymphadenopathy:      Cervical: No cervical adenopathy.   Skin:     General: Skin is warm and dry.      Findings: No rash.   Neurological:      General: No focal deficit present.      Mental Status: She is alert and oriented to person, place, and time.   Psychiatric:         Mood and Affect: Mood normal.

## 2024-11-19 NOTE — ASSESSMENT & PLAN NOTE
Recently diagnosed with this by dermatologist in [place].     Orders:    Ambulatory Referral to Rheumatology

## 2024-11-20 ENCOUNTER — TELEPHONE (OUTPATIENT)
Age: 54
End: 2024-11-20

## 2024-11-20 NOTE — TELEPHONE ENCOUNTER
PA for tirzepatide (Zepbound) 2.5 mg/0.5 mL auto-injector SUBMITTED to [facility]    via    []CMM-KEY:   [x]Surescripts-Case ID # [id number]   []Availity-Auth ID # NDC #   []Faxed to plan   []Other website   []Phone call Case ID #     [x]PA sent as URGENT    All office notes, labs and other pertaining documents and studies sent. Clinical questions answered. Awaiting determination from insurance company.     Turnaround time for your insurance to make a decision on your Prior Authorization can take 7-21 business days.                  \"[facility] [employer] Medical Information\" form printed and placed in Dr. Colorado's mail basket to complete & sign.    Last office visit:  4/6/23.

## 2024-11-21 ENCOUNTER — PATIENT MESSAGE (OUTPATIENT)
Dept: FAMILY MEDICINE CLINIC | Facility: CLINIC | Age: 54
End: 2024-11-21

## 2024-11-22 NOTE — PATIENT COMMUNICATION
Has diagnosis of elevated blood pressure without diagnosis of hypertension.   Could try to resubmit prior auth using that.....

## 2024-11-22 NOTE — TELEPHONE ENCOUNTER
"Please submit appeal for patient to include BMI of 28 and the following from     \"Has diagnosis of elevated blood pressure without diagnosis of hypertension.   Could try to resubmit prior auth using that.....\"    Please advise patient of any updates. Thank you.  "

## 2024-11-22 NOTE — TELEPHONE ENCOUNTER
Please have provider put this in chart notes to submit. Insurance is requesting documentation, they will not take verbal.

## 2024-11-22 NOTE — TELEPHONE ENCOUNTER
PA for tirzepatide (Zepbound) 2.5 mg/0.5 mL auto-injector DENIED    Reason:(Screenshot if applicable)        Message sent to office clinical pool Yes    Denial letter scanned into Media Yes    Appeal started No (Provider will need to decide if appeal is warranted and send clinical documentation to Prior Authorization Team for initiation.)    **Please follow up with your patient regarding denial and next steps**

## 2024-11-25 NOTE — TELEPHONE ENCOUNTER
"Her note already has the diagnosis  \"elevated blood pressure without diagnosis of hypertension\" included in the assessment and plan.   "

## 2024-11-25 NOTE — TELEPHONE ENCOUNTER
PA for Zepbound 2.5mg APPEALED via     []CMM  []SS  [x]Letter sent to insurance via fax to [phone] sent w/ provider msg below and OV notes- requested decision be expedited  []Other site or means     All necessary records sent. Will await response from insurance company    Turnaround time for a decision to be made on an appeal could take up to 30 business days

## 2024-11-26 ENCOUNTER — TELEPHONE (OUTPATIENT)
Age: 54
End: 2024-11-26

## 2024-11-26 NOTE — TELEPHONE ENCOUNTER
Called Dr Elder who is reviewing the denial for patients Zepbound, please advise-patient doesn't have any other health concerns, like Hypertension?

## 2024-11-26 NOTE — TELEPHONE ENCOUNTER
A doctor Jael called about this patient wanting to speak to doctor Mejia in regards to a zepbound review for this patient. He requested if she could please give him a call back at [phone]     thank you

## 2024-11-26 NOTE — TELEPHONE ENCOUNTER
Currently does not have diagnosis of hypertension.   She is a new patient to me. Had elevated bp reading when I saw her in office but no prior records for review and on no meds for hypertension. This may change moving forward

## 2024-12-19 LAB
ALBUMIN SERPL-MCNC: 4.6 G/DL (ref 3.8–4.9)
ALP SERPL-CCNC: 100 IU/L (ref 44–121)
ALT SERPL-CCNC: 12 IU/L (ref 0–32)
ANA TITR SER IF: NEGATIVE {TITER}
AST SERPL-CCNC: 20 IU/L (ref 0–40)
B BURGDOR IGG+IGM SER QL IA: NEGATIVE
BASOPHILS # BLD AUTO: 0 X10E3/UL (ref 0–0.2)
BASOPHILS NFR BLD AUTO: 1 %
BILIRUB SERPL-MCNC: 1 MG/DL (ref 0–1.2)
BUN SERPL-MCNC: 12 MG/DL (ref 6–24)
BUN/CREAT SERPL: 14 (ref 9–23)
CALCIUM SERPL-MCNC: 9.1 MG/DL (ref 8.7–10.2)
CCP IGA+IGG SERPL IA-ACNC: 6 UNITS (ref 0–19)
CHLORIDE SERPL-SCNC: 102 MMOL/L (ref 96–106)
CHOLEST SERPL-MCNC: 179 MG/DL (ref 100–199)
CO2 SERPL-SCNC: 26 MMOL/L (ref 20–29)
CREAT SERPL-MCNC: 0.87 MG/DL (ref 0.57–1)
CRP SERPL-MCNC: 4 MG/L (ref 0–10)
EGFR: 79 ML/MIN/1.73
EOSINOPHIL # BLD AUTO: 0.1 X10E3/UL (ref 0–0.4)
EOSINOPHIL NFR BLD AUTO: 2 %
ERYTHROCYTE [DISTWIDTH] IN BLOOD BY AUTOMATED COUNT: 12.7 % (ref 11.7–15.4)
GLOBULIN SER-MCNC: 2.4 G/DL (ref 1.5–4.5)
GLUCOSE SERPL-MCNC: 91 MG/DL (ref 70–99)
HCT VFR BLD AUTO: 43.7 % (ref 34–46.6)
HDLC SERPL-MCNC: 64 MG/DL
HGB BLD-MCNC: 14.5 G/DL (ref 11.1–15.9)
IMM GRANULOCYTES # BLD: 0 X10E3/UL (ref 0–0.1)
IMM GRANULOCYTES NFR BLD: 0 %
LDLC SERPL CALC-MCNC: 100 MG/DL (ref 0–99)
LYMPHOCYTES # BLD AUTO: 1.7 X10E3/UL (ref 0.7–3.1)
LYMPHOCYTES NFR BLD AUTO: 34 %
MCH RBC QN AUTO: 29.3 PG (ref 26.6–33)
MCHC RBC AUTO-ENTMCNC: 33.2 G/DL (ref 31.5–35.7)
MCV RBC AUTO: 88 FL (ref 79–97)
MONOCYTES # BLD AUTO: 0.4 X10E3/UL (ref 0.1–0.9)
MONOCYTES NFR BLD AUTO: 8 %
NEUTROPHILS # BLD AUTO: 2.8 X10E3/UL (ref 1.4–7)
NEUTROPHILS NFR BLD AUTO: 55 %
PLATELET # BLD AUTO: 209 X10E3/UL (ref 150–450)
POTASSIUM SERPL-SCNC: 3.9 MMOL/L (ref 3.5–5.2)
PROT SERPL-MCNC: 7 G/DL (ref 6–8.5)
RBC # BLD AUTO: 4.95 X10E6/UL (ref 3.77–5.28)
SL AMB VLDL CHOLESTEROL CALC: 15 MG/DL (ref 5–40)
SODIUM SERPL-SCNC: 143 MMOL/L (ref 134–144)
TRIGL SERPL-MCNC: 84 MG/DL (ref 0–149)
TSH SERPL DL<=0.005 MIU/L-ACNC: 1.21 UIU/ML (ref 0.45–4.5)
WBC # BLD AUTO: 5.1 X10E3/UL (ref 3.4–10.8)

## 2024-12-19 NOTE — PROGRESS NOTES
Name: Keena He      : 1970      MRN: [id number]  Encounter Provider: Yojana Andrews DO  Encounter Date: 2024   Encounter department: [facility] [address]  :  Assessment & Plan  Psoriasis  Patient is a 54-year-old female presenting for further evaluation of psoriasis and arthralgias.  Patient reports that she was diagnosed with psoriasis 2 years ago based on biopsy.  Patient was tried on Otezla in the past which she was not able to tolerate.  Patient has now been maintained on topical agents which she thinks has been controlling her symptoms well.  Reports that the psoriasis is 90% better.  In terms of arthralgias, patient reports less than 5 minutes of pain occasionally in her bilateral feet in the morning.  Denies any prolonged morning stiffness.  No joint swelling.  Denies dactylitis or enthesitis.  No synovitis or joint tenderness on exam today.  Currently, low suspicion for psoriatic arthritis.  -Discussed symptoms to monitor for.  Advised patient to take photos if she develops any joint swelling.  Let me know if developing prolonged morning stiffness  -If psoriasis is uncontrolled on topical agents, other considerations can be methotrexate or biologic therapy.  Would defer to dermatology for further management.  -Call with worsening joint pain       RTC in 3-4 months     Pertinent Medical History   Reviewed       History of Present Illness   Keena He is a 54 y.o. female with a history of psoriasis and depression who presents for further evaluation of joint pain    HPI   Patient states that about 2 years ago she started developing a dry rash on her right hand which then started to progress up the arm.  Patient states she was seen by dermatology and had a biopsy which was consistent with psoriasis.  Patient states that she was given topical steroids which were not helping.  Patient states she received 2 rounds of oral steroids which helped completely take away the  "psoriasis.  Patient is now on just topical agents for psoriasis.  Patient states that she was tried on Otezla but developed GI symptoms and so had to stop it.  Patient states that currently her symptoms are 90% better and so it was decided with her dermatologist just to stay on topical agents.    Patient reports occasional pain in her bilateral feet in the morning that last for the first few steps.  States it is less than 5 minutes.  Denies any joint swelling.  No prolonged morning stiffness.  No enthesitis or dactylitis type symptoms.    Family hx: no known history of autoimmune conditions      Review of Systems  Complete ROS conducted as per HPI. In addition, denies:  Fever  Photosensitive rash  Sicca symptoms  Recurrent oral ulcers  Muscle weakness  Uveitis  Dactylitis  Chest pain  SOB  Pleurisy  Gross hematuria  Foamy urine  Raynaud's  Joint issues other than noted above    Current Outpatient Medications on File Prior to Visit   Medication Sig Dispense Refill    Levonorgestrel 20.1 MCG/DAY IUD by Intrauterine route      sertraline (ZOLOFT) 100 mg tablet Take 1 tablet (100 mg total) by mouth daily 90 tablet 4    Vtama 1 % CREA APPLY 1(ONE) APPLICATION TOPICALLY EVERY NIGHT AT BEDTIME      [DISCONTINUED] clobetasol (TEMOVATE) 0.05 % cream Apply topically 2 (two) times a day Apply to effected area at bedtime for two weeks.       No current facility-administered medications on file prior to visit.      Social History     Tobacco Use    Smoking status: Never    Smokeless tobacco: Never   Vaping Use    Vaping status: Never Used   Substance and Sexual Activity    Alcohol use: Never    Drug use: Never    Sexual activity: Not Currently     Birth control/protection: I.U.D.         Objective   /80 (BP Location: Right arm, Patient Position: Sitting, Cuff Size: Adult)   Pulse 74   Temp (!) 96.7 °F (35.9 °C) (Tympanic)   Ht 5' 6\" (1.676 m)   Wt 77.1 kg (170 lb)   SpO2 97%   BMI 27.44 kg/m²     Physical " Exam  General appearance: normal appearing, no acute distress  Skin: normal, psoriasis appreciated on right third dorsal PIP  HEENT: normal, moist oropharynx, no nasal or oral ulcers  Lymph nodes: no palpable adenopathy  Lungs: normal respiratory effort, comfortable on room air, lungs clear to auscultation b/l   Heart: normal heart sounds, normal rate, normal rhythm,  Abdomen: soft, normal bowel sounds, no tenderness  Neurologic: no obvious neurological deficits   Extremities: no edema, warm and well perfused     Musculoskeletal Exam:   - Observation: no obvious joint abnormalities    - Palpation: no joint tenderness, negative MTP squeeze test  - Synovitis: absent  - Joint effusions: absent  - ROM: intact throughout  - Muscle Strength: 5/5 throughout       Recent labs:  CRP normal  Anti-CCP negative  MITZY negative    I have personally reviewed notes, labs, and imaging available in the chart.     Yojana Andrews DO, CCD, [facility]

## 2024-12-20 ENCOUNTER — CONSULT (OUTPATIENT)
Age: 54
End: 2024-12-20
Payer: COMMERCIAL

## 2024-12-20 ENCOUNTER — RESULTS FOLLOW-UP (OUTPATIENT)
Dept: FAMILY MEDICINE CLINIC | Facility: CLINIC | Age: 54
End: 2024-12-20

## 2024-12-20 VITALS
BODY MASS INDEX: 27.32 KG/M2 | SYSTOLIC BLOOD PRESSURE: 140 MMHG | TEMPERATURE: 96.7 F | DIASTOLIC BLOOD PRESSURE: 80 MMHG | OXYGEN SATURATION: 97 % | WEIGHT: 170 LBS | HEART RATE: 74 BPM | HEIGHT: 66 IN

## 2024-12-20 DIAGNOSIS — L40.9 PSORIASIS: Primary | ICD-10-CM

## 2024-12-20 PROCEDURE — 99204 OFFICE O/P NEW MOD 45 MIN: CPT | Performed by: STUDENT IN AN ORGANIZED HEALTH CARE EDUCATION/TRAINING PROGRAM

## 2024-12-20 NOTE — ASSESSMENT & PLAN NOTE
Patient is a 54-year-old female presenting for further evaluation of psoriasis and arthralgias.  Patient reports that she was diagnosed with psoriasis 2 years ago based on biopsy.  Patient was tried on Otezla in the past which she was not able to tolerate.  Patient has now been maintained on topical agents which she thinks has been controlling her symptoms well.  Reports that the psoriasis is 90% better.  In terms of arthralgias, patient reports less than 5 minutes of pain occasionally in her bilateral feet in the morning.  Denies any prolonged morning stiffness.  No joint swelling.  Denies dactylitis or enthesitis.  No synovitis or joint tenderness on exam today.  Currently, low suspicion for psoriatic arthritis.  -Discussed symptoms to monitor for.  Advised patient to take photos if she develops any joint swelling.  Let me know if developing prolonged morning stiffness  -If psoriasis is uncontrolled on topical agents, other considerations can be methotrexate or biologic therapy.  Would defer to dermatology for further management.  -Call with worsening joint pain

## 2024-12-23 ENCOUNTER — RA CDI HCC (OUTPATIENT)
Dept: OTHER | Facility: HOSPITAL | Age: 54
End: 2024-12-23

## 2024-12-23 NOTE — PROGRESS NOTES
HCC coding opportunities       Chart reviewed, no opportunity found: CHART REVIEWED, NO OPPORTUNITY FOUND        Patients Insurance        Commercial Insurance: [employer]

## 2024-12-31 ENCOUNTER — TELEPHONE (OUTPATIENT)
Age: 54
End: 2024-12-31

## 2024-12-31 ENCOUNTER — OFFICE VISIT (OUTPATIENT)
Dept: FAMILY MEDICINE CLINIC | Facility: CLINIC | Age: 54
End: 2024-12-31
Payer: COMMERCIAL

## 2024-12-31 VITALS
SYSTOLIC BLOOD PRESSURE: 135 MMHG | WEIGHT: 170.2 LBS | DIASTOLIC BLOOD PRESSURE: 88 MMHG | OXYGEN SATURATION: 99 % | BODY MASS INDEX: 27.47 KG/M2 | HEART RATE: 75 BPM

## 2024-12-31 DIAGNOSIS — I10 ESSENTIAL HYPERTENSION: Primary | ICD-10-CM

## 2024-12-31 PROCEDURE — 99214 OFFICE O/P EST MOD 30 MIN: CPT | Performed by: FAMILY MEDICINE

## 2024-12-31 RX ORDER — TIRZEPATIDE 2.5 MG/.5ML
2.5 INJECTION, SOLUTION SUBCUTANEOUS WEEKLY
Qty: 2 ML | Refills: 0 | Status: SHIPPED | OUTPATIENT
Start: 2024-12-31 | End: 2025-01-28

## 2024-12-31 RX ORDER — LISINOPRIL 5 MG/1
5 TABLET ORAL DAILY
Qty: 100 TABLET | Refills: 3 | Status: SHIPPED | OUTPATIENT
Start: 2024-12-31

## 2024-12-31 NOTE — ASSESSMENT & PLAN NOTE
Reviewed ambulatory bp log  Readings remain elevated and she was encouraged to follow DASH diet   Will also start lisinpril  5 mg once daily  Lab Results   Component Value Date    SODIUM 143 12/18/2024    K 3.9 12/18/2024     12/18/2024    CO2 26 12/18/2024    BUN 12 12/18/2024    CREATININE 0.87 12/18/2024    GLUC 91 12/18/2024    AST 20 12/18/2024    ALT 12 12/18/2024    TP 7.0 12/18/2024    TBILI 1.0 12/18/2024    EGFR 79 12/18/2024         Orders:    lisinopril (ZESTRIL) 5 mg tablet; Take 1 tablet (5 mg total) by mouth daily

## 2024-12-31 NOTE — TELEPHONE ENCOUNTER
PA for (Zepbound) 2.5 mg/0.5 mL auto-injector SUBMITTED to [facility]    via    []CMM-KEY:   [x]Surescripts-Case ID # [id number]   []Availity-Auth ID # NDC #   []Faxed to plan   []Other website   []Phone call Case ID #     []PA sent as URGENT    All office notes, labs and other pertaining documents and studies sent. Clinical questions answered. Awaiting determination from insurance company.     Turnaround time for your insurance to make a decision on your Prior Authorization can take 7-21 business days.

## 2024-12-31 NOTE — PROGRESS NOTES
Name: Keena He      : 1970      MRN: [id number]  Encounter Provider: Carleen Mejia DO  Encounter Date: 2024   Encounter department: [facility] PRIMARY CARE    Assessment & Plan  Essential hypertension  Reviewed ambulatory bp log  Readings remain elevated and she was encouraged to follow DASH diet   Will also start lisinpril  5 mg once daily  Lab Results   Component Value Date    SODIUM 143 2024    K 3.9 2024     2024    CO2 26 2024    BUN 12 2024    CREATININE 0.87 2024    GLUC 91 2024    AST 20 2024    ALT 12 2024    TP 7.0 2024    TBILI 1.0 2024    EGFR 79 2024         Orders:  •  lisinopril (ZESTRIL) 5 mg tablet; Take 1 tablet (5 mg total) by mouth daily    BMI 27.0-27.9,adult  Wants to try something for weight loss. Was not authorized when we tried last time but since she now has dx of hypertension that is being treated, will see if it goes through    Patient denies personal and family history of MCT and MEN2 tumors. Patient denies personal history of pancreatitis     Discussed potential side effects.   Orders:  •  tirzepatide (Zepbound) 2.5 mg/0.5 mL auto-injector; Inject 0.5 mL (2.5 mg total) under the skin once a week for 28 days         History of Present Illness     HPI  Patient is a 54 year old female who is being seen today for recheck of blood pressure and f/u on her weight. Was seen for PE in November and was given rx for zepbound. Weight at time of that OV was 173.     Her BP was elevated at 140/ 79.   No prior history of bp elevation. Advised to follow DASH diet.     Has been checking bp in ambulatory setting and bp has been elevated  143/95, 163/97, 152/88, 140/96, 149/91, 149/98, 151/96    The zepbound rx was denied due to her having no comorbid conditions.     Review of Systems  Past Medical History:   Diagnosis Date   • Depression Divorce    Managed with sertraline 75 mg   •  History of colonoscopy 07/12/2021   • History of screening mammography 11/19/2020    Bi-Rads 2C   • Miscarriage    • Psoriasis     Dr Tabares   • Varicella Child     Past Surgical History:   Procedure Laterality Date   • CHOLECYSTECTOMY  1997    laparoscopic   • COLONOSCOPY  07/2021    [facility]   • MAMMO (HISTORICAL)  11/24/2020    BIRADS 2C     Family History   Problem Relation Age of Onset   • Heart failure Mother    • Prostate cancer Father    • Colon polyps Father    • No Known Problems Sister    • No Known Problems Sister    • No Known Problems Sister    • Glaucoma Maternal Grandmother    • Lung cancer Maternal Grandfather    • No Known Problems Paternal Grandmother    • Colon cancer Paternal Grandfather    • Prostate cancer Paternal Grandfather    • No Known Problems Daughter    • No Known Problems Daughter    • No Known Problems Son    • Uterine cancer Neg Hx    • Breast cancer Neg Hx    • Ovarian cancer Neg Hx      Social History     Tobacco Use   • Smoking status: Never   • Smokeless tobacco: Never   Vaping Use   • Vaping status: Never Used   Substance and Sexual Activity   • Alcohol use: Never   • Drug use: Never   • Sexual activity: Not Currently     Birth control/protection: I.U.D.     Current Outpatient Medications on File Prior to Visit   Medication Sig   • Levonorgestrel 20.1 MCG/DAY IUD by Intrauterine route   • sertraline (ZOLOFT) 100 mg tablet Take 1 tablet (100 mg total) by mouth daily   • Vtama 1 % CREA APPLY 1(ONE) APPLICATION TOPICALLY EVERY NIGHT AT BEDTIME     Allergies   Allergen Reactions   • Sulfamethoxazole-Trimethoprim Hives   • Clindamycin Rash     Immunization History   Administered Date(s) Administered   • COVID-19 PFIZER VACCINE 0.3 ML IM 04/15/2021, 05/06/2021, 12/14/2021   • COVID-19 Pfizer Vac BIVALENT Deshawn-sucrose 12 Yr+ IM 11/16/2022   • COVID-19 Pfizer mRNA vacc PF deshawn-sucrose 12 yr and older (Comirnaty) 11/02/2023   • COVID-19 Pfizer vac (Deshawn-sucrose, gray cap) 12 yr+ IM  05/29/2022   • DTaP 01/12/1971, 04/13/1971, 05/11/1971, 05/09/1972, 05/23/1975   • Hep A, adult 07/18/1998   • INFLUENZA 09/10/2021, 11/17/2022, 11/02/2023   • MMR 07/24/1990   • Measles 11/09/1971   • Mumps 04/05/2019   • OPV 03/09/1971, 06/08/1971, 09/14/1971, 05/09/1972, 05/23/1975   • Td (adult), adsorbed 10/10/1983, 07/18/1998, 10/21/2015   • Zoster Vaccine Recombinant 06/24/2021, 08/26/2021   • influenza, injectable, quadrivalent 10/08/2024     Objective   /88 (BP Location: Left arm, Patient Position: Sitting, Cuff Size: Adult) Comment: Omiron- Patient device  Pulse 75   Wt 77.2 kg (170 lb 3.2 oz)   SpO2 99%   BMI 27.47 kg/m²     Physical Exam  Vitals and nursing note reviewed.   Constitutional:       General: She is not in acute distress.     Appearance: Normal appearance. She is not ill-appearing, toxic-appearing or diaphoretic.   HENT:      Head: Normocephalic and atraumatic.   Eyes:      Conjunctiva/sclera: Conjunctivae normal.   Cardiovascular:      Rate and Rhythm: Normal rate and regular rhythm.      Heart sounds: No murmur heard.  Pulmonary:      Effort: Pulmonary effort is normal.      Breath sounds: Normal breath sounds.   Musculoskeletal:      Cervical back: Normal range of motion.      Right lower leg: No edema.      Left lower leg: No edema.   Lymphadenopathy:      Cervical: No cervical adenopathy.   Skin:     Findings: No rash.   Neurological:      General: No focal deficit present.      Mental Status: She is alert.   Psychiatric:         Mood and Affect: Mood normal.

## 2025-01-09 NOTE — TELEPHONE ENCOUNTER
Faxed most recent office not from provider with diagnosis. Received confirmation fax was received.

## 2025-01-09 NOTE — TELEPHONE ENCOUNTER
Ashleigh from [facility] called stating she needs documentation that pt is type 2 diabetic and/or has hypertension for the Zepbound medication.    [phone]  [phone]

## 2025-01-31 ENCOUNTER — OFFICE VISIT (OUTPATIENT)
Dept: FAMILY MEDICINE CLINIC | Facility: CLINIC | Age: 55
End: 2025-01-31
Payer: COMMERCIAL

## 2025-01-31 VITALS
SYSTOLIC BLOOD PRESSURE: 110 MMHG | DIASTOLIC BLOOD PRESSURE: 78 MMHG | HEART RATE: 72 BPM | WEIGHT: 169 LBS | TEMPERATURE: 98.3 F | BODY MASS INDEX: 27.28 KG/M2 | OXYGEN SATURATION: 96 %

## 2025-01-31 DIAGNOSIS — I10 ESSENTIAL HYPERTENSION: ICD-10-CM

## 2025-01-31 DIAGNOSIS — Z79.899 MEDICATION MANAGEMENT: Primary | ICD-10-CM

## 2025-01-31 PROCEDURE — 99213 OFFICE O/P EST LOW 20 MIN: CPT | Performed by: FAMILY MEDICINE

## 2025-01-31 RX ORDER — TIRZEPATIDE 2.5 MG/.5ML
2.5 INJECTION, SOLUTION SUBCUTANEOUS WEEKLY
COMMUNITY
Start: 2025-01-17

## 2025-01-31 NOTE — PROGRESS NOTES
Name: Keena He      : 1970      MRN: [id number]  Encounter Provider: Carleen Mejia DO  Encounter Date: 2025   Encounter department: [facility] PRIMARY [facility]ARE    Assessment & Plan  Medication management  Doing well on zepbound. Only 2 doses thus far but really feels that it is decreasing her appetite.   She will continue efforts at watching and eating more fruits and veggies  Increase exercise   Call when needs refill. She seems to think she wants to remain on same dose rather than increasing but will let me know       BMI 27.0-27.9,adult         Essential hypertension  On lisinopril 5 and tolerating well.                 History of Present Illness     HPI      Patient is a 54 year old female with hypertension who is being seen today for med check/follow-up on her zepbound.     She was started on zepbound 24 and weight was 170 at time of that OV.     She is tolerating medication well thus far but has only had 2 doses thus far.     She has noticed a decrease in her appetite since starting. No longer snacking.     A little lightheaded at times. Thinks maybe not hydrating enough.     No nausea, heartburn or constipation      Review of Systems  Past Medical History:   Diagnosis Date    Depression Divorce    Managed with sertraline 75 mg    History of colonoscopy 2021    History of screening mammography 2020    Bi-Rads 2C    Miscarriage     Psoriasis     Dr Tabares    Varicella Child     Past Surgical History:   Procedure Laterality Date    CHOLECYSTECTOMY      laparoscopic    COLONOSCOPY  2021    [facility]    MAMMO (HISTORICAL)  2020    BIRADS 2C     Family History   Problem Relation Age of Onset    Heart failure Mother     Prostate cancer Father     Colon polyps Father     No Known Problems Sister     No Known Problems Sister     No Known Problems Sister     Glaucoma Maternal Grandmother     Lung cancer Maternal Grandfather     No Known Problems Paternal  Grandmother     Colon cancer Paternal Grandfather     Prostate cancer Paternal Grandfather     No Known Problems Daughter     No Known Problems Daughter     No Known Problems Son     Uterine cancer Neg Hx     Breast cancer Neg Hx     Ovarian cancer Neg Hx      Social History     Tobacco Use    Smoking status: Never    Smokeless tobacco: Never   Vaping Use    Vaping status: Never Used   Substance and Sexual Activity    Alcohol use: Never    Drug use: Never    Sexual activity: Not Currently     Birth control/protection: I.U.D.     Current Outpatient Medications on File Prior to Visit   Medication Sig    Levonorgestrel 20.1 MCG/DAY IUD by Intrauterine route    lisinopril (ZESTRIL) 5 mg tablet Take 1 tablet (5 mg total) by mouth daily    sertraline (ZOLOFT) 100 mg tablet Take 1 tablet (100 mg total) by mouth daily    Vtama 1 % CREA APPLY 1(ONE) APPLICATION TOPICALLY EVERY NIGHT AT BEDTIME    Zepbound 2.5 MG/0.5ML auto-injector Inject 2.5 mg under the skin once a week     Allergies   Allergen Reactions    Sulfamethoxazole-Trimethoprim Hives    Clindamycin Rash     Immunization History   Administered Date(s) Administered    COVID-19 PFIZER VACCINE 0.3 ML IM 04/15/2021, 05/06/2021, 12/14/2021    COVID-19 Pfizer Vac BIVALENT Desahwn-sucrose 12 Yr+ IM 11/16/2022    COVID-19 Pfizer mRNA vacc PF deshawn-sucrose 12 yr and older (Comirnaty) 11/02/2023    COVID-19 Pfizer vac (Deshawn-sucrose, gray cap) 12 yr+ IM 05/29/2022    DTaP 01/12/1971, 04/13/1971, 05/11/1971, 05/09/1972, 05/23/1975    Hep A, adult 07/18/1998    INFLUENZA 09/10/2021, 11/17/2022, 11/02/2023    MMR 07/24/1990    Measles 11/09/1971    Mumps 04/05/2019    OPV 03/09/1971, 06/08/1971, 09/14/1971, 05/09/1972, 05/23/1975    Td (adult), adsorbed 10/10/1983, 07/18/1998, 10/21/2015    Zoster Vaccine Recombinant 06/24/2021, 08/26/2021    influenza, injectable, quadrivalent 10/08/2024     Objective   /74   Pulse 72   Temp 98.3 °F (36.8 °C)   Wt 76.7 kg (169 lb)   SpO2  96%   BMI 27.28 kg/m²     Physical Exam  Vitals and nursing note reviewed.   Constitutional:       General: She is not in acute distress.     Appearance: Normal appearance. She is not ill-appearing, toxic-appearing or diaphoretic.   HENT:      Head: Normocephalic.   Cardiovascular:      Rate and Rhythm: Normal rate and regular rhythm.      Heart sounds: No murmur heard.  Pulmonary:      Effort: Pulmonary effort is normal.      Breath sounds: Normal breath sounds.   Abdominal:      General: Abdomen is flat. Bowel sounds are normal. There is no distension.      Palpations: Abdomen is soft. There is no mass.      Tenderness: There is no abdominal tenderness.   Musculoskeletal:      Cervical back: Normal range of motion and neck supple.      Right lower leg: No edema.      Left lower leg: No edema.   Lymphadenopathy:      Cervical: No cervical adenopathy.   Neurological:      Mental Status: She is alert.

## 2025-02-10 RX ORDER — TIRZEPATIDE 5 MG/.5ML
5 INJECTION, SOLUTION SUBCUTANEOUS WEEKLY
Qty: 2 ML | Refills: 0 | Status: SHIPPED | OUTPATIENT
Start: 2025-02-10

## 2025-03-14 RX ORDER — TIRZEPATIDE 5 MG/.5ML
INJECTION, SOLUTION SUBCUTANEOUS
OUTPATIENT
Start: 2025-03-14

## 2025-03-14 RX ORDER — TIRZEPATIDE 5 MG/.5ML
5 INJECTION, SOLUTION SUBCUTANEOUS WEEKLY
Qty: 2 ML | Refills: 0 | Status: SHIPPED | OUTPATIENT
Start: 2025-03-14

## 2025-03-28 ENCOUNTER — RA CDI HCC (OUTPATIENT)
Dept: OTHER | Facility: HOSPITAL | Age: 55
End: 2025-03-28

## 2025-03-28 NOTE — PROGRESS NOTES
HCC coding opportunities       Chart reviewed, no opportunity found: CHART REVIEWED, NO OPPORTUNITY FOUND        Patients Insurance        Commercial Insurance: [employer]

## 2025-04-03 NOTE — PROGRESS NOTES
Name: Keena He      : 1970      MRN: [id number]  Encounter Provider: Carleen Mejia DO  Encounter Date: 2025   Encounter department: [facility] PRIMARY [facility]    Assessment & Plan  Medication management  She continues to lose weight on this med and is very happy with her results.   She is eating better and walking for exercise  Encouraged her to increase protein intake and do some resistance training to prevent loss of muscle mass.        BMI 24.0-24.9, adult    Orders:    tirzepatide (Zepbound) 5 mg/0.5 mL auto-injector; Inject 0.5 mL (5 mg total) under the skin once a week    BMI 27.0-27.9,adult              History of Present Illness     HPI  Patient is a 54 year old female with hypertension, depression, psoriasis who is being seen today for medication management/follow-up on her zepbound    She was started on zepbound 24 and weight was 170 at time of that visit  Has been able to titrate up to the 5 mg dose and is tolerating well other than some reflux in evening hours for first few days after injection. Will just take a pepcid before bed on those days.     She continues to lose weight and is down to 155 lbs.   She prefers to remain at current dose if possible. Her goal is to get down to 130-135 lbs.     She is walking on daily basis for at least 30 minutes and watching her diet. Eating plenty of fruits and veggies.           Review of Systems  Past Medical History:   Diagnosis Date    Depression Divorce    Managed with sertraline 75 mg    History of colonoscopy 2021    History of screening mammography 2020    Bi-Rads 2C    Hypertension Divorce    Miscarriage     Psoriasis     Dr Tabares    Psoriatic arthritis (HCC) Aug 2023    Varicella Child     Past Surgical History:   Procedure Laterality Date    CHOLECYSTECTOMY  1997    laparoscopic    COLONOSCOPY  2021    [facility]    MAMMO (HISTORICAL)  2020    BIRADS 2C     Family History   Problem Relation Age of  Onset    Heart failure Mother     Prostate cancer Father     Colon polyps Father     No Known Problems Sister     No Known Problems Sister     No Known Problems Sister     Glaucoma Maternal Grandmother     Lung cancer Maternal Grandfather     No Known Problems Paternal Grandmother     Colon cancer Paternal Grandfather     Prostate cancer Paternal Grandfather     No Known Problems Daughter     No Known Problems Daughter     No Known Problems Son     Uterine cancer Neg Hx     Breast cancer Neg Hx     Ovarian cancer Neg Hx      Social History     Tobacco Use    Smoking status: Never    Smokeless tobacco: Never   Vaping Use    Vaping status: Never Used   Substance and Sexual Activity    Alcohol use: Never    Drug use: Never    Sexual activity: Not Currently     Birth control/protection: I.U.D.     Current Outpatient Medications on File Prior to Visit   Medication Sig    Levonorgestrel 20.1 MCG/DAY IUD by Intrauterine route    lisinopril (ZESTRIL) 5 mg tablet Take 1 tablet (5 mg total) by mouth daily    sertraline (ZOLOFT) 100 mg tablet Take 1 tablet (100 mg total) by mouth daily    Vtama 1 % CREA APPLY 1(ONE) APPLICATION TOPICALLY EVERY NIGHT AT BEDTIME    [DISCONTINUED] tirzepatide (Zepbound) 5 mg/0.5 mL auto-injector Inject 0.5 mL (5 mg total) under the skin once a week     Allergies   Allergen Reactions    Sulfamethoxazole-Trimethoprim Hives    Clindamycin Rash     Immunization History   Administered Date(s) Administered    COVID-19 PFIZER VACCINE 0.3 ML IM 04/15/2021, 05/06/2021, 12/14/2021    COVID-19 Pfizer Vac BIVALENT Deshawn-sucrose 12 Yr+ IM 11/16/2022    COVID-19 Pfizer mRNA vacc PF deshawn-sucrose 12 yr and older (Comirnaty) 11/02/2023    COVID-19 Pfizer vac (Deshawn-sucrose, gray cap) 12 yr+ IM 05/29/2022    DTaP 01/12/1971, 04/13/1971, 05/11/1971, 05/09/1972, 05/23/1975    Hep A, adult 07/18/1998    INFLUENZA 09/10/2021, 11/17/2022, 11/02/2023    MMR 07/24/1990    Measles 11/09/1971    Mumps 04/05/2019    OPV  "03/09/1971, 06/08/1971, 09/14/1971, 05/09/1972, 05/23/1975    Td (adult), adsorbed 10/10/1983, 07/18/1998, 10/21/2015    Zoster Vaccine Recombinant 06/24/2021, 08/26/2021    influenza, injectable, quadrivalent 10/08/2024     Objective   /59 (BP Location: Left arm, Patient Position: Sitting, Cuff Size: Standard)   Pulse 76   Temp 97.9 °F (36.6 °C) (Tympanic)   Ht 5' 6.5\" (1.689 m)   Wt 70.3 kg (155 lb)   SpO2 99%   BMI 24.64 kg/m²     Physical Exam  Vitals and nursing note reviewed.   Constitutional:       General: She is not in acute distress.     Appearance: Normal appearance. She is not ill-appearing, toxic-appearing or diaphoretic.   Cardiovascular:      Rate and Rhythm: Normal rate and regular rhythm.      Heart sounds: No murmur heard.  Pulmonary:      Effort: Pulmonary effort is normal.      Breath sounds: Normal breath sounds.   Abdominal:      General: Abdomen is flat. Bowel sounds are normal. There is no distension.      Palpations: Abdomen is soft. There is no mass.      Tenderness: There is no abdominal tenderness.   Musculoskeletal:      Cervical back: Normal range of motion and neck supple.   Lymphadenopathy:      Cervical: No cervical adenopathy.   Skin:     Findings: No rash.   Neurological:      General: No focal deficit present.      Mental Status: She is alert.         "

## 2025-04-04 ENCOUNTER — OFFICE VISIT (OUTPATIENT)
Dept: FAMILY MEDICINE CLINIC | Facility: CLINIC | Age: 55
End: 2025-04-04
Payer: COMMERCIAL

## 2025-04-04 VITALS
SYSTOLIC BLOOD PRESSURE: 132 MMHG | HEART RATE: 76 BPM | OXYGEN SATURATION: 99 % | TEMPERATURE: 97.9 F | HEIGHT: 67 IN | BODY MASS INDEX: 24.33 KG/M2 | WEIGHT: 155 LBS | DIASTOLIC BLOOD PRESSURE: 59 MMHG

## 2025-04-04 DIAGNOSIS — Z79.899 MEDICATION MANAGEMENT: Primary | ICD-10-CM

## 2025-04-04 PROCEDURE — 99213 OFFICE O/P EST LOW 20 MIN: CPT | Performed by: FAMILY MEDICINE

## 2025-04-04 RX ORDER — TIRZEPATIDE 5 MG/.5ML
5 INJECTION, SOLUTION SUBCUTANEOUS WEEKLY
Qty: 6 ML | Refills: 0 | Status: SHIPPED | OUTPATIENT
Start: 2025-04-04

## 2025-06-26 RX ORDER — TIRZEPATIDE 5 MG/.5ML
INJECTION, SOLUTION SUBCUTANEOUS
Qty: 2 ML | Refills: 2 | Status: SHIPPED | OUTPATIENT
Start: 2025-06-26

## 2025-07-10 NOTE — PROGRESS NOTES
Name: Keena He      : 1970      MRN: [id number]  Encounter Provider: Carleen Mejia DO  Encounter Date: 2025   Encounter department: [facility]    Assessment & Plan  Encounter for weight management  Patient is doing great and has lost 25 lbs since starting medication in december  Continue zepbound at 5 mg weekly dose  There may be issues with insurance/coverage moving forward from what she tells me.        BMI 23.0-23.9, adult              History of Present Illness     HPI  Patient is a 54 year old female with hypertension, depression, anxiety who is being seen today for f/u     On zepbound 5 mg weekly    She was started on zepbound 24 and weight was 170 at time of that visit. Down to 145 lbs today. Tolerating the med well though does admit some heartburn on first day of shot. Takes pepcid for this. She is not interested in increasing dose further.     On lisinopril 5 mg daily for her hypertension. She denies feeling any lightheadedness or dizziness.     No formal exercise. Active at home doing yardwork--weedeating, etc.     She was told that [facility] will no longer being carrying zepbound on their formulary. She tells me they will be asking her to change over to wegovy. She does not want to do this.     Review of Systems  Past Medical History[1]  Past Surgical History[2]  Family History[3]  Social History[4]  Medications[5]  Allergies   Allergen Reactions    Sulfamethoxazole-Trimethoprim Hives     Immunization History   Administered Date(s) Administered    COVID-19 PFIZER VACCINE 0.3 ML IM 04/15/2021, 2021, 2021    COVID-19 Pfizer Vac BIVALENT Deshawn-sucrose 12 Yr+ IM 2022    COVID-19 Pfizer mRNA vacc PF deshawn-sucrose 12 yr and older (Comirnaty) 2023    COVID-19 Pfizer vac (Deshawn-sucrose, gray cap) 12 yr+ IM 2022    DTaP 1971, 1971, 1971, 1972, 1975    Hep A, adult 1998    INFLUENZA  09/10/2021, 11/17/2022, 11/02/2023    MMR 07/24/1990    Measles 11/09/1971    Mumps 04/05/2019    OPV 03/09/1971, 06/08/1971, 09/14/1971, 05/09/1972, 05/23/1975    Td (adult), adsorbed 10/10/1983, 07/18/1998, 10/21/2015    Zoster Vaccine Recombinant 06/24/2021, 08/26/2021    influenza, injectable, quadrivalent 10/08/2024     Objective   /72   Pulse 75   Wt 65.8 kg (145 lb)   SpO2 98%   BMI 23.05 kg/m²     Physical Exam  Vitals and nursing note reviewed.   Constitutional:       General: She is not in acute distress.     Appearance: Normal appearance. She is not ill-appearing, toxic-appearing or diaphoretic.     Eyes:      Conjunctiva/sclera: Conjunctivae normal.       Cardiovascular:      Rate and Rhythm: Normal rate and regular rhythm.      Heart sounds: No murmur heard.  Pulmonary:      Effort: Pulmonary effort is normal.      Breath sounds: Normal breath sounds.   Abdominal:      General: There is no distension.      Palpations: Abdomen is soft. There is no mass.     Musculoskeletal:      Cervical back: Normal range of motion and neck supple.      Right lower leg: No edema.      Left lower leg: No edema.     Skin:     Findings: No rash.     Neurological:      General: No focal deficit present.      Mental Status: She is alert and oriented to person, place, and time.              [1]   Past Medical History:  Diagnosis Date    Depression Divorce    Managed with sertraline 75 mg    History of colonoscopy 07/12/2021    History of screening mammography 11/19/2020    Bi-Rads 2C    Hypertension Divorce    Miscarriage     Psoriasis     Dr Tabares    Psoriatic arthritis (HCC) Aug 2023    Varicella Child   [2]   Past Surgical History:  Procedure Laterality Date    CHOLECYSTECTOMY  1997    laparoscopic    COLONOSCOPY  07/2021    CGIH    MAMMO (HISTORICAL)  11/24/2020    BIRADS 2C   [3]   Family History  Problem Relation Name Age of Onset    Heart failure Mother Mother     Prostate cancer Father Sherman Varela  polyps Father Sherman     No Known Problems Sister Ama     No Known Problems Sister Eli     No Known Problems Sister Mellisa     Glaucoma Maternal Grandmother Estefany     Lung cancer Maternal Grandfather      No Known Problems Paternal Grandmother      Colon cancer Paternal Grandfather      Prostate cancer Paternal Grandfather      No Known Problems Daughter Patricia     No Known Problems Daughter Candice     No Known Problems Son Corey     Uterine cancer Neg Hx      Breast cancer Neg Hx      Ovarian cancer Neg Hx     [4]   Social History  Tobacco Use    Smoking status: Never    Smokeless tobacco: Never   Vaping Use    Vaping status: Never Used   Substance and Sexual Activity    Alcohol use: Never    Drug use: Never    Sexual activity: Not Currently     Birth control/protection: I.U.D.   [5]   Current Outpatient Medications on File Prior to Visit   Medication Sig    Levonorgestrel 20.1 MCG/DAY IUD by Intrauterine route    lisinopril (ZESTRIL) 5 mg tablet Take 1 tablet (5 mg total) by mouth daily    sertraline (ZOLOFT) 100 mg tablet Take 1 tablet (100 mg total) by mouth daily    tirzepatide (Zepbound) 5 mg/0.5 mL auto-injector INJECT 0.5 ML (5 MG TOTAL) UNDER THE SKIN ONE TIME PER WEEK    [DISCONTINUED] Vtama 1 % CREA APPLY 1(ONE) APPLICATION TOPICALLY EVERY NIGHT AT BEDTIME

## 2025-07-11 ENCOUNTER — OFFICE VISIT (OUTPATIENT)
Dept: FAMILY MEDICINE CLINIC | Facility: CLINIC | Age: 55
End: 2025-07-11
Payer: COMMERCIAL

## 2025-07-11 VITALS
WEIGHT: 145 LBS | SYSTOLIC BLOOD PRESSURE: 115 MMHG | DIASTOLIC BLOOD PRESSURE: 72 MMHG | OXYGEN SATURATION: 98 % | BODY MASS INDEX: 23.05 KG/M2 | HEART RATE: 75 BPM

## 2025-07-11 DIAGNOSIS — Z76.89 ENCOUNTER FOR WEIGHT MANAGEMENT: Primary | ICD-10-CM

## 2025-07-11 PROCEDURE — 99213 OFFICE O/P EST LOW 20 MIN: CPT | Performed by: FAMILY MEDICINE

## 2025-07-11 NOTE — ASSESSMENT & PLAN NOTE
Patient is doing great and has lost 25 lbs since starting medication in december  Continue zepbound at 5 mg weekly dose  There may be issues with insurance/coverage moving forward from what she tells me.